# Patient Record
Sex: FEMALE | Race: WHITE | NOT HISPANIC OR LATINO | URBAN - METROPOLITAN AREA
[De-identification: names, ages, dates, MRNs, and addresses within clinical notes are randomized per-mention and may not be internally consistent; named-entity substitution may affect disease eponyms.]

---

## 2017-07-06 ENCOUNTER — APPOINTMENT (RX ONLY)
Dept: URBAN - METROPOLITAN AREA CLINIC 86 | Facility: CLINIC | Age: 47
Setting detail: DERMATOLOGY
End: 2017-07-06

## 2017-07-06 DIAGNOSIS — L57.0 ACTINIC KERATOSIS: ICD-10-CM

## 2017-07-06 DIAGNOSIS — L57.8 OTHER SKIN CHANGES DUE TO CHRONIC EXPOSURE TO NONIONIZING RADIATION: ICD-10-CM

## 2017-07-06 DIAGNOSIS — L82.1 OTHER SEBORRHEIC KERATOSIS: ICD-10-CM

## 2017-07-06 DIAGNOSIS — D18.0 HEMANGIOMA: ICD-10-CM

## 2017-07-06 DIAGNOSIS — L72.0 EPIDERMAL CYST: ICD-10-CM

## 2017-07-06 DIAGNOSIS — D22 MELANOCYTIC NEVI: ICD-10-CM

## 2017-07-06 PROBLEM — D18.01 HEMANGIOMA OF SKIN AND SUBCUTANEOUS TISSUE: Status: ACTIVE | Noted: 2017-07-06

## 2017-07-06 PROBLEM — D22.5 MELANOCYTIC NEVI OF TRUNK: Status: ACTIVE | Noted: 2017-07-06

## 2017-07-06 PROCEDURE — ? PREMALIGNANT DESTRUCTION

## 2017-07-06 PROCEDURE — 17000 DESTRUCT PREMALG LESION: CPT

## 2017-07-06 PROCEDURE — 99214 OFFICE O/P EST MOD 30 MIN: CPT | Mod: 25

## 2017-07-06 PROCEDURE — ? COUNSELING

## 2017-07-06 ASSESSMENT — LOCATION SIMPLE DESCRIPTION DERM
LOCATION SIMPLE: LEFT FOREARM
LOCATION SIMPLE: ABDOMEN
LOCATION SIMPLE: UPPER BACK
LOCATION SIMPLE: LOWER BACK
LOCATION SIMPLE: RIGHT UPPER BACK
LOCATION SIMPLE: LEFT LIP
LOCATION SIMPLE: LEFT FOREARM

## 2017-07-06 ASSESSMENT — LOCATION DETAILED DESCRIPTION DERM
LOCATION DETAILED: LEFT LOWER CUTANEOUS LIP
LOCATION DETAILED: LEFT DISTAL DORSAL FOREARM
LOCATION DETAILED: LEFT DISTAL DORSAL FOREARM
LOCATION DETAILED: SUPERIOR LUMBAR SPINE
LOCATION DETAILED: SUPERIOR THORACIC SPINE
LOCATION DETAILED: RIGHT INFERIOR MEDIAL UPPER BACK
LOCATION DETAILED: EPIGASTRIC SKIN

## 2017-07-06 ASSESSMENT — LOCATION ZONE DERM
LOCATION ZONE: TRUNK
LOCATION ZONE: ARM
LOCATION ZONE: LIP
LOCATION ZONE: ARM

## 2017-07-06 NOTE — HPI: SKIN LESION
Is This A New Presentation, Or A Follow-Up?: Skin Lesion
How Severe Is Your Skin Lesion?: mild
Has Your Skin Lesion Been Treated?: not been treated
Which Family Member (Optional)?: Mother- early stage

## 2017-07-06 NOTE — PROCEDURE: PREMALIGNANT DESTRUCTION
Consent: The patient's consent was obtained including but not limited to risks of crusting, scabbing, blistering, scarring, darker or lighter pigmentary change, recurrence, incomplete removal and infection.
Detail Level: Simple
Method: liquid nitrogen
Anesthesia Volume In Cc: 0.5
Post-Procedure Care (Optional): The patient received detailed post-op instructions.
Post-Care Instructions: I reviewed with the patient in detail post-care instructions. Patient is to wear sunprotection, and avoid picking at any of the treated lesions. Pt may apply Vaseline to crusted or scabbing areas.

## 2018-06-13 ENCOUNTER — APPOINTMENT (RX ONLY)
Dept: URBAN - METROPOLITAN AREA CLINIC 86 | Facility: CLINIC | Age: 48
Setting detail: DERMATOLOGY
End: 2018-06-13

## 2018-06-13 DIAGNOSIS — L82.1 OTHER SEBORRHEIC KERATOSIS: ICD-10-CM

## 2018-06-13 DIAGNOSIS — D22 MELANOCYTIC NEVI: ICD-10-CM

## 2018-06-13 DIAGNOSIS — D18.0 HEMANGIOMA: ICD-10-CM

## 2018-06-13 DIAGNOSIS — L57.8 OTHER SKIN CHANGES DUE TO CHRONIC EXPOSURE TO NONIONIZING RADIATION: ICD-10-CM

## 2018-06-13 DIAGNOSIS — L57.0 ACTINIC KERATOSIS: ICD-10-CM

## 2018-06-13 PROBLEM — D22.5 MELANOCYTIC NEVI OF TRUNK: Status: ACTIVE | Noted: 2018-06-13

## 2018-06-13 PROBLEM — D18.01 HEMANGIOMA OF SKIN AND SUBCUTANEOUS TISSUE: Status: ACTIVE | Noted: 2018-06-13

## 2018-06-13 PROCEDURE — ? LIQUID NITROGEN

## 2018-06-13 PROCEDURE — ? TREATMENT REGIMEN

## 2018-06-13 PROCEDURE — 99213 OFFICE O/P EST LOW 20 MIN: CPT | Mod: 25

## 2018-06-13 PROCEDURE — 17003 DESTRUCT PREMALG LES 2-14: CPT

## 2018-06-13 PROCEDURE — 17000 DESTRUCT PREMALG LESION: CPT

## 2018-06-13 PROCEDURE — ? COUNSELING

## 2018-06-13 ASSESSMENT — LOCATION DETAILED DESCRIPTION DERM
LOCATION DETAILED: LEFT DISTAL DORSAL FOREARM
LOCATION DETAILED: SUPERIOR LUMBAR SPINE
LOCATION DETAILED: RIGHT DISTAL DORSAL FOREARM
LOCATION DETAILED: SUPERIOR THORACIC SPINE
LOCATION DETAILED: EPIGASTRIC SKIN
LOCATION DETAILED: RIGHT INFERIOR MEDIAL UPPER BACK

## 2018-06-13 ASSESSMENT — LOCATION ZONE DERM
LOCATION ZONE: TRUNK
LOCATION ZONE: ARM

## 2018-06-13 ASSESSMENT — LOCATION SIMPLE DESCRIPTION DERM
LOCATION SIMPLE: LOWER BACK
LOCATION SIMPLE: RIGHT UPPER BACK
LOCATION SIMPLE: UPPER BACK
LOCATION SIMPLE: LEFT FOREARM
LOCATION SIMPLE: ABDOMEN
LOCATION SIMPLE: RIGHT FOREARM

## 2018-06-13 NOTE — PROCEDURE: LIQUID NITROGEN
Render Post-Care Instructions In Note?: no
Post-Care Instructions: I reviewed with the patient in detail post-care instructions. Patient is to wear sunprotection, and avoid picking at any of the treated lesions. Pt may apply Vaseline to crusted or scabbing areas.
Number Of Freeze-Thaw Cycles: 3 freeze-thaw cycles
Consent: The patient's consent was obtained including but not limited to risks of crusting, scabbing, blistering, scarring, darker or lighter pigmentary change, recurrence, incomplete removal and infection.
Duration Of Freeze Thaw-Cycle (Seconds): 3
Detail Level: Detailed

## 2018-09-18 ENCOUNTER — APPOINTMENT (RX ONLY)
Dept: URBAN - METROPOLITAN AREA CLINIC 86 | Facility: CLINIC | Age: 48
Setting detail: DERMATOLOGY
End: 2018-09-18

## 2018-09-18 DIAGNOSIS — L57.0 ACTINIC KERATOSIS: ICD-10-CM

## 2018-09-18 DIAGNOSIS — B07.8 OTHER VIRAL WARTS: ICD-10-CM

## 2018-09-18 PROCEDURE — ? LIQUID NITROGEN

## 2018-09-18 PROCEDURE — 17000 DESTRUCT PREMALG LESION: CPT | Mod: 59

## 2018-09-18 PROCEDURE — 17110 DESTRUCTION B9 LES UP TO 14: CPT

## 2018-09-18 PROCEDURE — ? COUNSELING

## 2018-09-18 ASSESSMENT — LOCATION DETAILED DESCRIPTION DERM
LOCATION DETAILED: RIGHT PROXIMAL DORSAL SMALL FINGER
LOCATION DETAILED: RIGHT DISTAL DORSAL FOREARM
LOCATION DETAILED: LEFT PROXIMAL DORSAL SMALL FINGER

## 2018-09-18 ASSESSMENT — LOCATION ZONE DERM
LOCATION ZONE: ARM
LOCATION ZONE: FINGER

## 2018-09-18 ASSESSMENT — LOCATION SIMPLE DESCRIPTION DERM
LOCATION SIMPLE: RIGHT SMALL FINGER
LOCATION SIMPLE: LEFT SMALL FINGER
LOCATION SIMPLE: RIGHT FOREARM

## 2018-09-18 NOTE — PROCEDURE: LIQUID NITROGEN
Render Post-Care Instructions In Note?: no
Duration Of Freeze Thaw-Cycle (Seconds): 3
Number Of Freeze-Thaw Cycles: 3 freeze-thaw cycles
Post-Care Instructions: I reviewed with the patient in detail post-care instructions. Patient is to wear sunprotection, and avoid picking at any of the treated lesions. Pt may apply Vaseline to crusted or scabbing areas.
Detail Level: Detailed
Consent: The patient's consent was obtained including but not limited to risks of crusting, scabbing, blistering, scarring, darker or lighter pigmentary change, recurrence, incomplete removal and infection.
Medical Necessity Information: It is in your best interest to select a reason for this procedure from the list below. All of these items fulfill various CMS LCD requirements except the new and changing color options.
Medical Necessity Clause: This procedure was medically necessary because the lesions that were treated were:

## 2023-05-01 ENCOUNTER — APPOINTMENT (RX ONLY)
Dept: URBAN - METROPOLITAN AREA CLINIC 86 | Facility: CLINIC | Age: 53
Setting detail: DERMATOLOGY
End: 2023-05-01

## 2023-05-01 DIAGNOSIS — L259 CONTACT DERMATITIS AND OTHER ECZEMA, UNSPECIFIED CAUSE: ICD-10-CM | Status: INADEQUATELY CONTROLLED

## 2023-05-01 PROBLEM — L23.9 ALLERGIC CONTACT DERMATITIS, UNSPECIFIED CAUSE: Status: ACTIVE | Noted: 2023-05-01

## 2023-05-01 PROCEDURE — ? COUNSELING

## 2023-05-01 PROCEDURE — ? ADDITIONAL NOTES

## 2023-05-01 PROCEDURE — ? PRESCRIPTION MEDICATION MANAGEMENT

## 2023-05-01 PROCEDURE — ? PRESCRIPTION

## 2023-05-01 PROCEDURE — 99203 OFFICE O/P NEW LOW 30 MIN: CPT

## 2023-05-01 RX ORDER — DESOXIMETASONE 0.5 MG/G
ONE APPLICATION CREAM TOPICAL BID
Qty: 60 | Refills: 2 | Status: ERX | COMMUNITY
Start: 2023-05-01

## 2023-05-01 RX ADMIN — DESOXIMETASONE ONE APPLICATION: 0.5 CREAM TOPICAL at 00:00

## 2023-05-01 ASSESSMENT — PAIN INTENSITY VAS: HOW INTENSE IS YOUR PAIN 0 BEING NO PAIN, 10 BEING THE MOST SEVERE PAIN POSSIBLE?: NO PAIN

## 2023-05-01 ASSESSMENT — LOCATION ZONE DERM
LOCATION ZONE: NECK
LOCATION ZONE: SCALP

## 2023-05-01 ASSESSMENT — LOCATION DETAILED DESCRIPTION DERM
LOCATION DETAILED: RIGHT POSTAURICULAR SKIN
LOCATION DETAILED: LEFT INFERIOR POSTAURICULAR SKIN
LOCATION DETAILED: LEFT CENTRAL LATERAL NECK
LOCATION DETAILED: LEFT POSTERIOR NECK
LOCATION DETAILED: RIGHT CENTRAL LATERAL NECK
LOCATION DETAILED: RIGHT POSTERIOR NECK

## 2023-05-01 ASSESSMENT — LOCATION SIMPLE DESCRIPTION DERM
LOCATION SIMPLE: NECK
LOCATION SIMPLE: POSTERIOR SCALP
LOCATION SIMPLE: POSTERIOR NECK

## 2023-05-01 ASSESSMENT — ITCH NUMERIC RATING SCALE: HOW SEVERE IS YOUR ITCHING?: 5

## 2023-05-01 ASSESSMENT — BSA RASH: BSA RASH: 3

## 2023-05-01 ASSESSMENT — SEVERITY ASSESSMENT: SEVERITY: MILD TO MODERATE

## 2023-05-01 NOTE — PROCEDURE: MIPS QUALITY
Quality 110: Preventive Care And Screening: Influenza Immunization: Influenza Immunization Administered during Influenza season
Quality 47: Advance Care Plan: Advance Care Planning discussed and documented in the medical record; patient did not wish or was not able to name a surrogate decision maker or provide an advance care plan.
Detail Level: Detailed
Quality 226: Preventive Care And Screening: Tobacco Use: Screening And Cessation Intervention: Patient screened for tobacco use and is an ex/non-smoker
Additional Notes: Shingles vaccine: No

## 2023-05-01 NOTE — PROCEDURE: PRESCRIPTION MEDICATION MANAGEMENT
Initiate Treatment: DHS Clear Shampoo\\nDesoximetsone 0.05% cream BID
Render In Strict Bullet Format?: No
Detail Level: Zone
Plan: Pt may have allergy to hair products or medicaments.\\nRec patch testing.\\nFollow up in 3 weeks.
Discontinue Regimen: Ketoconazole shampoo and cream\\nTAC 0.1% BID PRN

## 2023-05-01 NOTE — PROCEDURE: ADDITIONAL NOTES
Detail Level: Simple
Render Risk Assessment In Note?: yes
Additional Notes: Pt scheduled for patch testing.

## 2023-05-24 ENCOUNTER — APPOINTMENT (RX ONLY)
Dept: URBAN - METROPOLITAN AREA CLINIC 86 | Facility: CLINIC | Age: 53
Setting detail: DERMATOLOGY
End: 2023-05-24

## 2023-05-24 VITALS — WEIGHT: 145 LBS | HEIGHT: 61 IN

## 2023-05-24 DIAGNOSIS — L259 CONTACT DERMATITIS AND OTHER ECZEMA, UNSPECIFIED CAUSE: ICD-10-CM | Status: IMPROVED

## 2023-05-24 PROBLEM — L23.9 ALLERGIC CONTACT DERMATITIS, UNSPECIFIED CAUSE: Status: ACTIVE | Noted: 2023-05-24

## 2023-05-24 PROCEDURE — ? PRESCRIPTION MEDICATION MANAGEMENT

## 2023-05-24 PROCEDURE — 99213 OFFICE O/P EST LOW 20 MIN: CPT

## 2023-05-24 PROCEDURE — ? COUNSELING

## 2023-05-24 ASSESSMENT — LOCATION ZONE DERM
LOCATION ZONE: NECK
LOCATION ZONE: TRUNK

## 2023-05-24 ASSESSMENT — LOCATION DETAILED DESCRIPTION DERM
LOCATION DETAILED: MID TRAPEZIAL NECK
LOCATION DETAILED: MIDDLE STERNUM

## 2023-05-24 ASSESSMENT — LOCATION SIMPLE DESCRIPTION DERM
LOCATION SIMPLE: TRAPEZIAL NECK
LOCATION SIMPLE: CHEST

## 2023-05-24 ASSESSMENT — SEVERITY ASSESSMENT: SEVERITY: MILD

## 2023-05-24 ASSESSMENT — ITCH NUMERIC RATING SCALE: HOW SEVERE IS YOUR ITCHING?: 2

## 2023-05-24 ASSESSMENT — BSA RASH: BSA RASH: 1

## 2023-05-24 NOTE — PROCEDURE: PRESCRIPTION MEDICATION MANAGEMENT
Render In Strict Bullet Format?: No
Plan: Follow up for patch testing.
Continue Regimen: -DHS Clear shampoo\\n-desoximetasone cream BID\\n-Free&Clear products
Detail Level: Zone

## 2023-05-24 NOTE — PROCEDURE: MIPS QUALITY
Quality 110: Preventive Care And Screening: Influenza Immunization: Influenza Immunization Administered during Influenza season
Quality 47: Advance Care Plan: Advance Care Planning discussed and documented; advance care plan or surrogate decision maker documented in the medical record.
Additional Notes: Shingles vaccine: No
Detail Level: Detailed
Quality 226: Preventive Care And Screening: Tobacco Use: Screening And Cessation Intervention: Patient screened for tobacco use and is an ex/non-smoker

## 2023-06-19 ENCOUNTER — APPOINTMENT (RX ONLY)
Dept: URBAN - METROPOLITAN AREA CLINIC 86 | Facility: CLINIC | Age: 53
Setting detail: DERMATOLOGY
End: 2023-06-19

## 2023-06-19 VITALS — WEIGHT: 145 LBS | HEIGHT: 61 IN

## 2023-06-19 DIAGNOSIS — L259 CONTACT DERMATITIS AND OTHER ECZEMA, UNSPECIFIED CAUSE: ICD-10-CM

## 2023-06-19 PROBLEM — L23.9 ALLERGIC CONTACT DERMATITIS, UNSPECIFIED CAUSE: Status: ACTIVE | Noted: 2023-06-19

## 2023-06-19 PROCEDURE — ? ADDITIONAL NOTES

## 2023-06-19 PROCEDURE — 95044 PATCH/APPLICATION TESTS: CPT

## 2023-06-19 PROCEDURE — ? COUNSELING

## 2023-06-19 PROCEDURE — ? PATCH TESTING

## 2023-06-19 ASSESSMENT — LOCATION SIMPLE DESCRIPTION DERM: LOCATION SIMPLE: LEFT UPPER BACK

## 2023-06-19 ASSESSMENT — LOCATION ZONE DERM: LOCATION ZONE: TRUNK

## 2023-06-19 ASSESSMENT — LOCATION DETAILED DESCRIPTION DERM: LOCATION DETAILED: LEFT MID-UPPER BACK

## 2023-06-19 NOTE — PROCEDURE: ADDITIONAL NOTES
Render Risk Assessment In Note?: yes
Additional Notes: ACDS Core 2020 Series of 90 allergens
Detail Level: Simple

## 2023-06-19 NOTE — PROCEDURE: PATCH TESTING
Detail Level: None
Number Of Patches (Maximum Allowable Per Dos By Cms Is 90): 90
Post-Care Instructions: I reviewed with the patient in detail post-care instructions. Patient should not sweat, pick at, or get the patches wet for 48 hours.
Consent: Written consent obtained, risks reviewed including but not limited to rash, itching, allergic reaction, systemic rash, remote possiblity of anaphylaxis to allergen.

## 2023-06-21 ENCOUNTER — APPOINTMENT (RX ONLY)
Dept: URBAN - METROPOLITAN AREA CLINIC 86 | Facility: CLINIC | Age: 53
Setting detail: DERMATOLOGY
End: 2023-06-21

## 2023-06-21 VITALS — HEIGHT: 61 IN | WEIGHT: 145 LBS

## 2023-06-21 DIAGNOSIS — L259 CONTACT DERMATITIS AND OTHER ECZEMA, UNSPECIFIED CAUSE: ICD-10-CM | Status: INADEQUATELY CONTROLLED

## 2023-06-21 PROBLEM — L23.9 ALLERGIC CONTACT DERMATITIS, UNSPECIFIED CAUSE: Status: ACTIVE | Noted: 2023-06-21

## 2023-06-21 PROCEDURE — ? CORE ACDS PATCH TEST READING

## 2023-06-21 PROCEDURE — 99212 OFFICE O/P EST SF 10 MIN: CPT

## 2023-06-21 PROCEDURE — ? COUNSELING ALLERGENS

## 2023-06-21 ASSESSMENT — LOCATION ZONE DERM: LOCATION ZONE: TRUNK

## 2023-06-21 ASSESSMENT — LOCATION SIMPLE DESCRIPTION DERM: LOCATION SIMPLE: LEFT UPPER BACK

## 2023-06-21 ASSESSMENT — LOCATION DETAILED DESCRIPTION DERM: LOCATION DETAILED: LEFT MID-UPPER BACK

## 2023-06-21 NOTE — PROCEDURE: MIPS QUALITY
Quality 110: Preventive Care And Screening: Influenza Immunization: Influenza Immunization Administered during Influenza season
Additional Notes: Shingles vaccine: No
Quality 47: Advance Care Plan: Advance Care Planning discussed and documented; advance care plan or surrogate decision maker documented in the medical record.
Quality 226: Preventive Care And Screening: Tobacco Use: Screening And Cessation Intervention: Patient screened for tobacco use and is an ex/non-smoker
Detail Level: Detailed

## 2023-06-21 NOTE — PROCEDURE: CORE ACDS PATCH TEST READING
Name Of Allergen 62: Sodium benzoate
Allergen 41 Reaction: no reaction
Name Of Allergen 8: Paraben Mix
Name Of Allergen 67: Sorbitan sesquioleate
Name Of Allergen 18: Quaternium 15 (Dowicil 200)
Name Of Allergen 56: Tosylamide formaldehyde resin
Name Of Allergen 44: Oleamidopropyl dimethylamine
Name Of Allergen 81: Cetyl steryl alcohol
Name Of Allergen 65: Compositae Mix
Name Of Allergen 72: Clobetasol-17 propionate
Name Of Allergen 63: Sorbic acid
What Reading Time Point?: 48 hour
Name Of Allergen 39: Polymyxin B
Name Of Allergen 82: [place] 2.5% pet
Name Of Allergen 79: 2-Ethylhexyl-4-methoxycinnamate (Parsol MCX)
Name Of Allergen 89: Lauryl polyglucose 3.0% pet
Name Of Allergen 64: Ylang-Ylang oil (Cananga odorata)
Name Of Allergen 86: Peppermint 2% pet
Name Of Allergen 36: Lidocaine
Name Of Allergen 68: n,n-Diphenylguanidine
Name Of Allergen 37: Propylene glycol
Name Of Allergen 59: Limonene 0.5% pet
Name Of Allergen 83: Benzyl Salicylate 10% pet
Name Of Allergen 24: Thiuram Mix
Name Of Allergen 71: Triamcinolone
Name Of Allergen 17: Methylchlorisothiazolinone/Methylisothiazolinone
Name Of Allergen 78: BHT (2-Ditert-butyl-4-cresol)
Name Of Allergen 50: Ethyl acrylate
Name Of Allergen 10: Balsam of [place] (Myroxylon pereirae)
Name Of Allergen 25: Diazolidinyl urea (Germall II)
Name Of Allergen 14: Epoxy Resin (Bisphenol A)
Name Of Allergen 28: Gold sodium thiosulfate
Name Of Allergen 27: [id number]-[id number]
Name Of Allergen 9: Methylisothiazolinone
Name Of Allergen 30: Budesonide
Name Of Allergen 58: Cocamide LIBRADO (coconut LIBRADO)
Name Of Allergen 29: Imidazolidinyl urea (Germal 115)
Name Of Allergen 6: Fragrance Mix I
Name Of Allergen 1: Nickel
Name Of Allergen 31: Hydrocortisone-17-butyrate
Name Of Allergen 38: Iodopropynyl butylcarbamate
Name Of Allergen 46: Methyl methacrylate
Name Of Allergen 51: Tea tree oil
Name Of Allergen 33: Bacitracin
Name Of Allergen 84: Disperse Yellow3 1% pet
Name Of Allergen 66: Ethyleneurea melanine-formaldehyde
Name Of Allergen 26: Benzocaine
Name Of Allergen 53: Propolis
Name Of Allergen 47: Lavender absolute
Name Of Allergen 60: Benzalkonium chloride
Name Of Allergen 21: Formaldehyde 2% aq
Name Of Allergen 48: Cinnamal - Cinnamic aldehyde
Name Of Allergen 16: Black Rubber Mix
Name Of Allergen 3: Neomycin
Name Of Allergen 5: DMDM Hydantoin
Name Of Allergen 7: Colophony
Name Of Allergen 55: Benzophenone-3 (2-Hydroxy-4-methoxybenzophenone)
Name Of Allergen 40: Cocamidopropyl betaine
Name Of Allergen 32: Mercaptobenzothiazole
Name Of Allergen 57: Sesquiterpene lactone Mix
Name Of Allergen 90: 4-Chloro-3-cresol ([facility])
Number Of Patches Read: 90
Name Of Allergen 43: HEMA Hydroxyethyl methacrylate
Name Of Allergen 77: Benzoic acid
Name Of Allergen 49: Tocopherol
Name Of Allergen 74: Ethyl cyanoacrylate
Name Of Allergen 52: Chlorhexidine digluconate
Name Of Allergen 2: Lanolin (Amerchol 101)
Show Allergen Counseling In The Note?: Yes
Name Of Allergen 19: Linalool 5%pet
Name Of Allergen 88: Shellac 20% alcohol
Name Of Allergen 13: f-wfpw-Vkznklqyiya formaldehyde resin
Name Of Allergen 69: Lyral5% pet
Name Of Allergen 75: Phenoxyethanol
Name Of Allergen 22: Mercapto Mix
Name Of Allergen 80: Benzyl alcohol
Name Of Allergen 12: Cobalt chloride
Name Of Allergen 85: Aminata 2% pet
Name Of Allergen 15: Carba Mix
Name Of Allergen 73: Amidoamine
Name Of Allergen 87: Pramoxine HCL 2% pet
Name Of Allergen 76: Disperse Orange 3
Name Of Allergen 35: Disperse Blue 106/124 Mix
Name Of Allergen 4: Potassium dichromate
Detail Level: Zone
Name Of Allergen 34: Fragrance Mix II
Name Of Allergen 42: 3-(Dimethylamino)-propylamine
Name Of Allergen 70: Ethylhexylglycerin
Name Of Allergen 45: Decyl glucoside
Name Of Allergen 54: Chloroxylenol (PCMX)
Name Of Allergen 61: Benzophenone-4 (2-Hydroxy-4-methoxybenzophenone-5)
Name Of Allergen 41: Mixed dialkyl thioureas
Name Of Allergen 20: p-Phenylenediamine
Name Of Allergen 23: Bronopol 2-Bromo-2-nitropropane-1,3-diol
Name Of Allergen 11: Ethylenediamine dihydrochloride

## 2023-06-22 ENCOUNTER — APPOINTMENT (RX ONLY)
Dept: URBAN - METROPOLITAN AREA CLINIC 86 | Facility: CLINIC | Age: 53
Setting detail: DERMATOLOGY
End: 2023-06-22

## 2023-06-22 DIAGNOSIS — L30.9 DERMATITIS, UNSPECIFIED: ICD-10-CM

## 2023-06-22 DIAGNOSIS — L259 CONTACT DERMATITIS AND OTHER ECZEMA, UNSPECIFIED CAUSE: ICD-10-CM

## 2023-06-22 PROBLEM — L23.9 ALLERGIC CONTACT DERMATITIS, UNSPECIFIED CAUSE: Status: ACTIVE | Noted: 2023-06-22

## 2023-06-22 PROCEDURE — ? COUNSELING ALLERGENS

## 2023-06-22 PROCEDURE — ? PRESCRIPTION MEDICATION MANAGEMENT

## 2023-06-22 PROCEDURE — ? ADDITIONAL NOTES

## 2023-06-22 PROCEDURE — ? COUNSELING

## 2023-06-22 PROCEDURE — ? BIOPSY BY PUNCH METHOD

## 2023-06-22 PROCEDURE — 11104 PUNCH BX SKIN SINGLE LESION: CPT

## 2023-06-22 PROCEDURE — 99212 OFFICE O/P EST SF 10 MIN: CPT | Mod: 25

## 2023-06-22 PROCEDURE — ? CORE ACDS PATCH TEST READING

## 2023-06-22 ASSESSMENT — LOCATION DETAILED DESCRIPTION DERM
LOCATION DETAILED: LEFT MID-UPPER BACK
LOCATION DETAILED: LEFT SUPERIOR MEDIAL MIDBACK

## 2023-06-22 ASSESSMENT — LOCATION SIMPLE DESCRIPTION DERM
LOCATION SIMPLE: LEFT UPPER BACK
LOCATION SIMPLE: LEFT LOWER BACK

## 2023-06-22 ASSESSMENT — LOCATION ZONE DERM
LOCATION ZONE: TRUNK
LOCATION ZONE: TRUNK

## 2023-06-22 NOTE — PROCEDURE: PRESCRIPTION MEDICATION MANAGEMENT
Plan: - discussed possible triggers \\n- continue free and clear products \\n- continue Desoximethasone cream 0.05% apply twice a day as needed for flares
Detail Level: Zone
Render In Strict Bullet Format?: No

## 2023-06-22 NOTE — PROCEDURE: CORE ACDS PATCH TEST READING
Allergen 46 Reaction: no reaction
Name Of Allergen 10: Balsam of [place] (Myroxylon pereirae)
Name Of Allergen 72: Clobetasol-17 propionate
Name Of Allergen 86: Peppermint 2% pet
Number Of Patches Read: 90
Name Of Allergen 73: Amidoamine
Show Allergen Counseling In The Note?: No
Name Of Allergen 64: Ylang-Ylang oil (Cananga odorata)
Name Of Allergen 84: Disperse Yellow3 1% pet
Name Of Allergen 87: Pramoxine HCL 2% pet
Detail Level: Zone
Name Of Allergen 30: Budesonide
Name Of Allergen 46: Methyl methacrylate
What Reading Time Point?: 72 hour
Name Of Allergen 57: Sesquiterpene lactone Mix
Name Of Allergen 31: Hydrocortisone-17-butyrate
Name Of Allergen 3: Neomycin
Name Of Allergen 1: Nickel
Name Of Allergen 5: DMDM Hydantoin
Name Of Allergen 34: Fragrance Mix II
Name Of Allergen 77: Benzoic acid
Name Of Allergen 58: Cocamide LIBRADO (coconut LIBRADO)
Name Of Allergen 85: Aminata 2% pet
Name Of Allergen 41: Mixed dialkyl thioureas
Name Of Allergen 36: Lidocaine
Name Of Allergen 35: Disperse Blue 106/124 Mix
Name Of Allergen 23: Bronopol 2-Bromo-2-nitropropane-1,3-diol
Name Of Allergen 76: Disperse Orange 3
Name Of Allergen 48: Cinnamal - Cinnamic aldehyde
Name Of Allergen 63: Sorbic acid
Name Of Allergen 19: Linalool 0.5%pet
Name Of Allergen 51: Tea tree oil
Name Of Allergen 6: Fragrance Mix I
Name Of Allergen 12: Cobalt chloride
Name Of Allergen 9: Methylisothiazolinone
Name Of Allergen 50: Ethyl acrylate
Name Of Allergen 65: Compositae Mix
Name Of Allergen 15: Carba Mix
Name Of Allergen 66: Ethyleneurea melanine-formaldehyde
Name Of Allergen 62: Sodium benzoate
Name Of Allergen 26: Benzocaine
Name Of Allergen 40: Cocamidopropyl betaine
Name Of Allergen 69: Lyral 5% pet
Name Of Allergen 45: Decyl glucoside
Name Of Allergen 44: Oleamidopropyl dimethylamine
Name Of Allergen 18: Quaternium 15 (Dowicil 200)
Name Of Allergen 28: Gold sodium thiosulfate
Name Of Allergen 56: Tosylamide formaldehyde resin
Name Of Allergen 7: Colophony
Name Of Allergen 49: Tocopherol
Name Of Allergen 22: Mercapto Mix
Name Of Allergen 71: Triamcinolone
Name Of Allergen 89: Lauryl polyglucose 3.0% pet
Name Of Allergen 80: Benzyl alcohol
Name Of Allergen 75: Phenoxyethanol
Show Negative Results In The Note?: Yes
Name Of Allergen 60: Benzalkonium chloride
Name Of Allergen 2: Lanolin (Amerchol 101)
Name Of Allergen 43: HEMA Hydroxyethyl methacrylate
Name Of Allergen 59: Limonene 0.2%pet
Name Of Allergen 52: Chlorhexidine digluconate
Name Of Allergen 13: w-xiwd-Hrfwbgjzyib formaldehyde resin
Name Of Allergen 11: Ethylenediamine dihydrochloride
Name Of Allergen 70: Ethylhexylglycerin
Name Of Allergen 24: Thiuram Mix
Name Of Allergen 82: [place] 2.5% pet
Name Of Allergen 8: Paraben Mix
Name Of Allergen 42: 3-(Dimethylamino)-propylamine
Name Of Allergen 61: Benzophenone-4 (2-Hydroxy-4-methoxybenzophenone-5)
Name Of Allergen 29: Imidazolidinyl urea (Germal 115)
Name Of Allergen 17: Methylchlorisothiazolinone/Methylisothiazolinone
Name Of Allergen 14: Epoxy Resin (Bisphenol A)
Name Of Allergen 47: Lavender absolute
Name Of Allergen 90: 4-Chloro-3-cresol ([facility])
Name Of Allergen 78: BHT (2-Ditert-butyl-4-cresol)
Name Of Allergen 32: Mercaptobenzothiazole
Name Of Allergen 53: Propolis
Name Of Allergen 16: Black Rubber Mix
Name Of Allergen 74: Ethyl cyanoacrylate
Name Of Allergen 79: 2-Ethylhexyl-4-methoxycinnamate (Parsol MCX)
Name Of Allergen 27: [id number]
Name Of Allergen 83: Benzyl Salicylate 10% pet
Name Of Allergen 4: Potassium dichromate
Name Of Allergen 37: Propylene glycol
Name Of Allergen 55: Benzophenone-3 (2-Hydroxy-4-methoxybenzophenone)
Name Of Allergen 38: Iodopropynyl butylcarbamate
Name Of Allergen 81: Cetyl steryl alcohol
Name Of Allergen 54: Chloroxylenol (PCMX)
Name Of Allergen 88: Shellac 20% alcohol
Name Of Allergen 21: Formaldehyde 2% aq
Name Of Allergen 68: n,n-Diphenylguanidine
Name Of Allergen 20: p-Phenylenediamine
Name Of Allergen 33: Bacitracin
Name Of Allergen 25: Diazolidinyl urea (Germall II)
Name Of Allergen 67: Sorbitan sesquioleate
Name Of Allergen 39: Polymyxin B

## 2023-06-22 NOTE — PROCEDURE: BIOPSY BY PUNCH METHOD
Detail Level: Detailed
Was A Bandage Applied: Yes
Punch Size In Mm: 3
Size Of Lesion In Cm (Optional): 0
Depth Of Punch Biopsy: dermis
Biopsy Type: H and E
Anesthesia Type: 1% lidocaine with epinephrine
Anesthesia Volume In Cc (Will Not Render If 0): 0.5
Hemostasis: None
Epidermal Sutures: 5-0 Ethilon
Wound Care: Polysporin ointment
Dressing: bandage
Suture Removal: 14 days
Patient Will Remove Sutures At Home?: No
Consent: Verbal consent was obtained and risks were reviewed including but not limited to scarring, infection, bleeding, scabbing, incomplete removal, nerve damage and allergy to anesthesia.
Post-Care Instructions: I reviewed with the patient in detail post-care instructions. Patient is to keep the biopsy site dry overnight, and then apply Vaseline daily until healed.
Home Suture Removal Text: Patient was provided a home suture removal kit and will remove their sutures at home.  If they have any questions or difficulties they will call the office.
Notification Instructions: Patient will be notified of biopsy results. However, patient instructed to call the office if not contacted within 2 weeks.
Billing Type: Third-Party Bill
Information: Selecting Yes will display possible errors in your note based on the variables you have selected. This validation is only offered as a suggestion for you. PLEASE NOTE THAT THE VALIDATION TEXT WILL BE REMOVED WHEN YOU FINALIZE YOUR NOTE. IF YOU WANT TO FAX A PRELIMINARY NOTE YOU WILL NEED TO TOGGLE THIS TO 'NO' IF YOU DO NOT WANT IT IN YOUR FAXED NOTE.